# Patient Record
Sex: FEMALE | Race: WHITE | NOT HISPANIC OR LATINO | Employment: FULL TIME | URBAN - METROPOLITAN AREA
[De-identification: names, ages, dates, MRNs, and addresses within clinical notes are randomized per-mention and may not be internally consistent; named-entity substitution may affect disease eponyms.]

---

## 2021-07-30 ENCOUNTER — IMMUNIZATION (OUTPATIENT)
Dept: PRIMARY CARE CLINIC | Facility: CLINIC | Age: 46
End: 2021-07-30

## 2021-07-30 DIAGNOSIS — Z23 NEED FOR VACCINATION: Primary | ICD-10-CM

## 2021-08-27 ENCOUNTER — IMMUNIZATION (OUTPATIENT)
Dept: PRIMARY CARE CLINIC | Facility: CLINIC | Age: 46
End: 2021-08-27
Payer: OTHER GOVERNMENT

## 2021-08-27 DIAGNOSIS — Z23 NEED FOR VACCINATION: Primary | ICD-10-CM

## 2021-08-27 PROCEDURE — 0002A COVID-19, MRNA, LNP-S, PF, 30 MCG/0.3 ML DOSE VACCINE: ICD-10-PCS | Mod: CV19,,, | Performed by: EMERGENCY MEDICINE

## 2021-08-27 PROCEDURE — 0002A COVID-19, MRNA, LNP-S, PF, 30 MCG/0.3 ML DOSE VACCINE: CPT | Mod: CV19,,, | Performed by: EMERGENCY MEDICINE

## 2021-08-27 PROCEDURE — 91300 COVID-19, MRNA, LNP-S, PF, 30 MCG/0.3 ML DOSE VACCINE: ICD-10-PCS | Mod: ,,, | Performed by: EMERGENCY MEDICINE

## 2021-08-27 PROCEDURE — 91300 COVID-19, MRNA, LNP-S, PF, 30 MCG/0.3 ML DOSE VACCINE: CPT | Mod: ,,, | Performed by: EMERGENCY MEDICINE

## 2022-07-14 ENCOUNTER — TELEPHONE (OUTPATIENT)
Dept: OBSTETRICS AND GYNECOLOGY | Facility: CLINIC | Age: 47
End: 2022-07-14
Payer: COMMERCIAL

## 2022-07-14 NOTE — TELEPHONE ENCOUNTER
Pt called to schedule a hormone consult. Pt states on hormones but started having menopausal symptoms again. advised pt she will have to fill out a questionnaire first then we can get her scheduled. Will send over to Leslee to send pt questionnaire

## 2022-07-14 NOTE — TELEPHONE ENCOUNTER
----- Message from Candy Nieto sent at 7/14/2022  3:25 PM CDT -----  Good afternoon. Pt would like to est care with Dr. Vora. Pt is having some hormone problem. Pt canbe reached at [phone].

## 2022-07-18 ENCOUNTER — PATIENT MESSAGE (OUTPATIENT)
Dept: OBSTETRICS AND GYNECOLOGY | Facility: CLINIC | Age: 47
End: 2022-07-18
Payer: COMMERCIAL

## 2022-10-26 ENCOUNTER — OFFICE VISIT (OUTPATIENT)
Dept: OBSTETRICS AND GYNECOLOGY | Facility: CLINIC | Age: 47
End: 2022-10-26
Attending: OBSTETRICS & GYNECOLOGY
Payer: COMMERCIAL

## 2022-10-26 VITALS
DIASTOLIC BLOOD PRESSURE: 83 MMHG | WEIGHT: 152.38 LBS | HEIGHT: 67 IN | SYSTOLIC BLOOD PRESSURE: 121 MMHG | BODY MASS INDEX: 23.92 KG/M2 | HEART RATE: 80 BPM

## 2022-10-26 DIAGNOSIS — N95.1 MENOPAUSAL SYMPTOMS: Primary | ICD-10-CM

## 2022-10-26 PROCEDURE — 3079F DIAST BP 80-89 MM HG: CPT | Mod: CPTII,S$GLB,, | Performed by: PHYSICIAN ASSISTANT

## 2022-10-26 PROCEDURE — 1160F RVW MEDS BY RX/DR IN RCRD: CPT | Mod: CPTII,S$GLB,, | Performed by: PHYSICIAN ASSISTANT

## 2022-10-26 PROCEDURE — 3079F PR MOST RECENT DIASTOLIC BLOOD PRESSURE 80-89 MM HG: ICD-10-PCS | Mod: CPTII,S$GLB,, | Performed by: PHYSICIAN ASSISTANT

## 2022-10-26 PROCEDURE — 3074F SYST BP LT 130 MM HG: CPT | Mod: CPTII,S$GLB,, | Performed by: PHYSICIAN ASSISTANT

## 2022-10-26 PROCEDURE — 99999 PR PBB SHADOW E&M-EST. PATIENT-LVL IV: ICD-10-PCS | Mod: PBBFAC,,, | Performed by: PHYSICIAN ASSISTANT

## 2022-10-26 PROCEDURE — 1159F PR MEDICATION LIST DOCUMENTED IN MEDICAL RECORD: ICD-10-PCS | Mod: CPTII,S$GLB,, | Performed by: PHYSICIAN ASSISTANT

## 2022-10-26 PROCEDURE — 99204 OFFICE O/P NEW MOD 45 MIN: CPT | Mod: S$GLB,,, | Performed by: PHYSICIAN ASSISTANT

## 2022-10-26 PROCEDURE — 1160F PR REVIEW ALL MEDS BY PRESCRIBER/CLIN PHARMACIST DOCUMENTED: ICD-10-PCS | Mod: CPTII,S$GLB,, | Performed by: PHYSICIAN ASSISTANT

## 2022-10-26 PROCEDURE — 3074F PR MOST RECENT SYSTOLIC BLOOD PRESSURE < 130 MM HG: ICD-10-PCS | Mod: CPTII,S$GLB,, | Performed by: PHYSICIAN ASSISTANT

## 2022-10-26 PROCEDURE — 99204 PR OFFICE/OUTPT VISIT, NEW, LEVL IV, 45-59 MIN: ICD-10-PCS | Mod: S$GLB,,, | Performed by: PHYSICIAN ASSISTANT

## 2022-10-26 PROCEDURE — 1159F MED LIST DOCD IN RCRD: CPT | Mod: CPTII,S$GLB,, | Performed by: PHYSICIAN ASSISTANT

## 2022-10-26 PROCEDURE — 99999 PR PBB SHADOW E&M-EST. PATIENT-LVL IV: CPT | Mod: PBBFAC,,, | Performed by: PHYSICIAN ASSISTANT

## 2022-10-26 RX ORDER — PROGESTERONE 100 MG/1
100 CAPSULE ORAL NIGHTLY
COMMUNITY
Start: 2022-10-26 | End: 2023-11-10

## 2022-10-26 RX ORDER — ESTRADIOL 1 MG/1
1 TABLET ORAL DAILY
Qty: 30 TABLET | Refills: 11 | Status: SHIPPED | OUTPATIENT
Start: 2022-10-26 | End: 2023-10-04

## 2022-10-26 RX ORDER — PROGESTERONE 100 MG/1
100 CAPSULE ORAL DAILY
Qty: 30 CAPSULE | Refills: 11 | Status: SHIPPED | OUTPATIENT
Start: 2022-10-26 | End: 2023-10-26

## 2022-10-26 NOTE — PROGRESS NOTES
Subjective:      Marie Singh is a 47 y.o. female who presents for HRT consult. Menarche at age 12. She is . Menopause at age 42. Started on HRT with PCP about 4 years ago for increased anxiety, mood swings and hot flashes. Has been taking Progesterone 100mg QHS and compounded Biest (50:50)/ 10/ 1mg Testosterone cream daily. Working well until started having vaginal spotting in , July and August of this year. Light bleeding and only lasted 1-2 days each month. PCP obtained pelvic US  on 8/10/22 that showed sub endometrial cyst. All bleeding has resolved and feels good. However, not sure if these are the hormones that she should be on.   No prior cardiac history, family history of MI prior to age 50, or personal history of gestational DM/pre-eclampsia. She denies the following contraindications to HRT:  Vaginal bleeding, history of VTE/PE, thrombophilia,  breast cancer, or active liver disease.     Sister with breast cancer at age 36, genetics negative. Sister is a patient of Dr. Vora and would like to establish care with her.     8/10/22 Labs (scanned in media)  Estradiol 103  Total Testosterone 42    PCP: Radha Inman    Routine labs: 22 (scanned in media)  WWE: 2021 with PCP  Pap smear: 22 negative (Scanned in media)  Mammogram: 7/15/22 TC 19.3% at [facility] (scanned in media)      No visits with results within 3 Month(s) from this visit.   Latest known visit with results is:   No results found for any previous visit.       History reviewed. No pertinent past medical history.  History reviewed. No pertinent surgical history.  Social History     Tobacco Use    Smoking status: Never    Smokeless tobacco: Never   Substance Use Topics    Alcohol use: Yes    Drug use: Never     Family History   Problem Relation Age of Onset    Breast cancer Sister      OB History    Para Term  AB Living   2 2           SAB IAB Ectopic Multiple Live Births                  # Outcome Date GA Lbr  "Bryce/2nd Weight Sex Delivery Anes PTL Lv   2 Para            1 Para                Current Outpatient Medications:     progesterone (PROMETRIUM) 100 MG capsule, Take 100 mg by mouth every evening., Disp: , Rfl:     ciprofloxacin HCl (CIPRO) 500 MG tablet, Take 1 tablet (500 mg total) by mouth 2 (two) times a day., Disp: 14 tablet, Rfl: 0    dexAMETHasone (DECADRON) 4 MG Tab, Take 1 tablet by mouth once daily, Disp: 4 tablet, Rfl: 0    estradioL (ESTRACE) 1 MG tablet, Take 1 tablet (1 mg total) by mouth once daily., Disp: 30 tablet, Rfl: 11    ondansetron (ZOFRAN-ODT) 4 MG TbDL, Take 1 tablet (4 mg total) by mouth every 8 (eight) hours as needed (nausea)., Disp: 20 tablet, Rfl: 3    progesterone (PROMETRIUM) 100 MG capsule, Take 1 capsule (100 mg total) by mouth once daily., Disp: 30 capsule, Rfl: 11    semaglutide (OZEMPIC) 0.25 mg or 0.5 mg(2 mg/1.5 mL) pen injector, Inject 0.25 mg into the skin every 7 days., Disp: 1 pen, Rfl: 5    UNABLE TO FIND, medication name: Testosterone 1% Cream 4 clicks (1mg) daily to upper inner thigh, Disp: 30 g, Rfl: 5    The ASCVD Risk score (Teetee DK, et al., 2019) failed to calculate for the following reasons:    Cannot find a previous HDL lab    Cannot find a previous total cholesterol lab    Review of Systems:  General: No fever, chills, or weight loss.  Chest: No chest pain, shortness of breath, or palpitations.  Breast: No pain, masses, or nipple discharge.  Vulva: No pain, lesions, or itching.  Vagina: No relaxation, itching, discharge, or lesions.  Abdomen: No pain, nausea, vomiting, diarrhea, or constipation.  Urinary: No incontinence, nocturia, frequency, or dysuria.  Extremities:  No leg cramps, edema, or calf pain.  Neurologic: No headaches, dizziness, or visual changes.    Objective:     Vitals:    10/26/22 1427   BP: 121/83   Pulse: 80   Weight: 69.1 kg (152 lb 6.4 oz)   Height: 5' 7" (1.702 m)   PainSc: 0-No pain     Body mass index is 23.87 kg/m².      Physical Exam: " Deferred      Assessment:      Menopausal symptoms: Likely getting more estradiol with compounded cream causing bleeding with estradiol level of 103 while taking progesterone 100mg.  Will switch to oral estrace 1mg and continue progesterone 100mg to have stable hormone levels. Continues testosterone cream for [facility]. Spoke with pharmacist and confirmed continue dose of testosterone cream 1mg daily.  -     estradioL (ESTRACE) 1 MG tablet; Take 1 tablet (1 mg total) by mouth once daily.  Dispense: 30 tablet; Refill: 11  -     progesterone (PROMETRIUM) 100 MG capsule; Take 1 capsule (100 mg total) by mouth once daily.  Dispense: 30 capsule; Refill: 11  -     Estradiol; Future; Expected date: 10/26/2022  -     Testosterone, free; Future; Expected date: 10/26/2022      Plan:   Risks and benefits of hormone replacement therapy were discussed.  D/C Biest/Testosterone cream.  Start estrace 1mg QAM.  Continue Progesterone 100mg QHS.  Testosterone 1% cream 1mg daily to upper inner thigh- called into [facility] P# [phone]  Contact if bleeding returns and will repeat pelvic US/schedule Endo bx.  3 month HRT follow up with Dr. Vora with labs 1 week prior.  Follow up sooner PRN.    Instructed patient to call if she experiences any side effects or has any questions.    I spent a total of 35 minutes on the day of the visit.This includes face to face time and non-face to face time preparing to see the patient (eg, review of tests), obtaining and/or reviewing separately obtained history, documenting clinical information in the electronic or other health record, independently interpreting results and communicating results to the patient/family/caregiver, or care coordinator.    A full discussion of the benefit-risk ratio of hormonal replacement therapy was carried out. Improvement in vasomotor and other climacteric symptoms is discussed, including possible improvements in sleep and mood. The range of side effects such as  breast tenderness, weight gain and including possible increases in lifetime risk of breast cancer and possible thrombotic complications was discussed. All of her questions about this therapy were answered.

## 2023-01-13 ENCOUNTER — TELEPHONE (OUTPATIENT)
Dept: OBSTETRICS AND GYNECOLOGY | Facility: CLINIC | Age: 48
End: 2023-01-13
Payer: COMMERCIAL

## 2023-01-13 NOTE — TELEPHONE ENCOUNTER
----- Message from Oly Smallwood MA sent at 10/28/2022  3:16 PM CDT -----  She needs an HRT follow up with Dr. Vora in 3 months with hormone levels a week prior please. Her sister sees Vielka and would like to establish. Thanks

## 2023-05-03 ENCOUNTER — PATIENT MESSAGE (OUTPATIENT)
Dept: OBSTETRICS AND GYNECOLOGY | Facility: CLINIC | Age: 48
End: 2023-05-03
Payer: COMMERCIAL

## 2023-05-09 ENCOUNTER — OFFICE VISIT (OUTPATIENT)
Dept: OBSTETRICS AND GYNECOLOGY | Facility: CLINIC | Age: 48
End: 2023-05-09
Payer: COMMERCIAL

## 2023-05-09 VITALS
SYSTOLIC BLOOD PRESSURE: 112 MMHG | WEIGHT: 140 LBS | BODY MASS INDEX: 21.97 KG/M2 | HEIGHT: 67 IN | DIASTOLIC BLOOD PRESSURE: 79 MMHG

## 2023-05-09 DIAGNOSIS — N95.1 MENOPAUSAL SYMPTOMS: Primary | ICD-10-CM

## 2023-05-09 PROCEDURE — 1160F RVW MEDS BY RX/DR IN RCRD: CPT | Mod: CPTII,S$GLB,, | Performed by: PHYSICIAN ASSISTANT

## 2023-05-09 PROCEDURE — 99213 PR OFFICE/OUTPT VISIT, EST, LEVL III, 20-29 MIN: ICD-10-PCS | Mod: S$GLB,,, | Performed by: PHYSICIAN ASSISTANT

## 2023-05-09 PROCEDURE — 3078F PR MOST RECENT DIASTOLIC BLOOD PRESSURE < 80 MM HG: ICD-10-PCS | Mod: CPTII,S$GLB,, | Performed by: PHYSICIAN ASSISTANT

## 2023-05-09 PROCEDURE — 1159F MED LIST DOCD IN RCRD: CPT | Mod: CPTII,S$GLB,, | Performed by: PHYSICIAN ASSISTANT

## 2023-05-09 PROCEDURE — 3078F DIAST BP <80 MM HG: CPT | Mod: CPTII,S$GLB,, | Performed by: PHYSICIAN ASSISTANT

## 2023-05-09 PROCEDURE — 1160F PR REVIEW ALL MEDS BY PRESCRIBER/CLIN PHARMACIST DOCUMENTED: ICD-10-PCS | Mod: CPTII,S$GLB,, | Performed by: PHYSICIAN ASSISTANT

## 2023-05-09 PROCEDURE — 3074F SYST BP LT 130 MM HG: CPT | Mod: CPTII,S$GLB,, | Performed by: PHYSICIAN ASSISTANT

## 2023-05-09 PROCEDURE — 99999 PR PBB SHADOW E&M-EST. PATIENT-LVL III: CPT | Mod: PBBFAC,,, | Performed by: PHYSICIAN ASSISTANT

## 2023-05-09 PROCEDURE — 3008F PR BODY MASS INDEX (BMI) DOCUMENTED: ICD-10-PCS | Mod: CPTII,S$GLB,, | Performed by: PHYSICIAN ASSISTANT

## 2023-05-09 PROCEDURE — 1159F PR MEDICATION LIST DOCUMENTED IN MEDICAL RECORD: ICD-10-PCS | Mod: CPTII,S$GLB,, | Performed by: PHYSICIAN ASSISTANT

## 2023-05-09 PROCEDURE — 99999 PR PBB SHADOW E&M-EST. PATIENT-LVL III: ICD-10-PCS | Mod: PBBFAC,,, | Performed by: PHYSICIAN ASSISTANT

## 2023-05-09 PROCEDURE — 3074F PR MOST RECENT SYSTOLIC BLOOD PRESSURE < 130 MM HG: ICD-10-PCS | Mod: CPTII,S$GLB,, | Performed by: PHYSICIAN ASSISTANT

## 2023-05-09 PROCEDURE — 99213 OFFICE O/P EST LOW 20 MIN: CPT | Mod: S$GLB,,, | Performed by: PHYSICIAN ASSISTANT

## 2023-05-09 PROCEDURE — 3008F BODY MASS INDEX DOCD: CPT | Mod: CPTII,S$GLB,, | Performed by: PHYSICIAN ASSISTANT

## 2023-05-09 NOTE — PROGRESS NOTES
Subjective:      Marie Singh is a 47 y.o. female who presents for follow-up of hormone replacement therapy.  At her last visit on 10/26/2022, she reported that she was feeling well on Progesterone 100mg QHS and compounded Biest (50:50)/ 10/ 1mg Testosterone cream daily. Had episodes of spotting with the cream. Switched to oral estradiol.    PLAN on 10/26/2022:  D/C Biest/Testosterone cream.  Start estrace 1mg QAM.  Continue Progesterone 100mg QHS.  Testosterone 1% cream 1mg daily to upper inner thigh- called into [facility]    The patient reports that she is feeling well. Mood is good. Denies vaginal bleeding. Denies adverse side effects. She did have hot flashes for about 1 week but have since resolved. Had not had any prior. Her libido is still low and reports fatigue.     PCP: Radha Inman    Routine labs: 22 (scanned in media)  WWE: 2021 with PCP  Pap smear: 22 negative (Scanned in media)  Mammogram: 7/15/22 TC 19.3% at [facility] (scanned in media)    Lab Visit on 2023   Component Date Value Ref Range Status    Estradiol 2023 34  See Text pg/mL Final    Testosterone, Free 2023 <0.4  pg/mL Final       No past medical history on file.  No past surgical history on file.  Social History     Tobacco Use    Smoking status: Never    Smokeless tobacco: Never   Substance Use Topics    Alcohol use: Yes    Drug use: Never     Family History   Problem Relation Age of Onset    Breast cancer Sister      OB History    Para Term  AB Living   2 2           SAB IAB Ectopic Multiple Live Births                  # Outcome Date GA Lbr Bryce/2nd Weight Sex Delivery Anes PTL Lv   2 Para            1 Para                Current Outpatient Medications:     amoxicillin (AMOXIL) 500 MG capsule, Take 1 capsule (500 mg total) by mouth every 12 (twelve) hours., Disp: 20 capsule, Rfl: 0    ciprofloxacin HCl (CIPRO) 500 MG tablet, Take 1 tablet (500 mg total) by mouth 2 (two) times a day., Disp: 14 tablet,  "Rfl: 0    dexAMETHasone (DECADRON) 4 MG Tab, Take 1 tablet (4 mg total) by mouth once daily., Disp: 4 tablet, Rfl: 1    estradioL (ESTRACE) 1 MG tablet, Take 1 tablet (1 mg total) by mouth once daily., Disp: 30 tablet, Rfl: 11    ondansetron (ZOFRAN-ODT) 4 MG TbDL, Take 1 tablet (4 mg total) by mouth every 8 (eight) hours as needed (nausea)., Disp: 20 tablet, Rfl: 3    progesterone (PROMETRIUM) 100 MG capsule, Take 100 mg by mouth every evening., Disp: , Rfl:     progesterone (PROMETRIUM) 100 MG capsule, Take 1 capsule (100 mg total) by mouth once daily., Disp: 30 capsule, Rfl: 11    semaglutide (OZEMPIC) 0.25 mg or 0.5 mg(2 mg/1.5 mL) pen injector, Inject 0.25 mg into the skin every 7 days., Disp: 1 pen, Rfl: 5    UNABLE TO FIND, medication name: Testosterone 2% Cream 4 clicks (1mg) daily to upper inner thigh, Disp: 30 g, Rfl: 5    Review of Systems:  General: No fever, chills, or weight loss.  Chest: No chest pain, shortness of breath, or palpitations.  Breast: No pain, masses, or nipple discharge.  Vulva: No pain, lesions, or itching.  Vagina: No relaxation, itching, discharge, or lesions.  Abdomen: No pain, nausea, vomiting, diarrhea, or constipation.  Urinary: No incontinence, nocturia, frequency, or dysuria.  Extremities:  No leg cramps, edema, or calf pain.  Neurologic: No headaches, dizziness, or visual changes.    Objective:     Vitals:    05/09/23 1409   BP: 112/79   Weight: 63.5 kg (140 lb)   Height: 5' 7" (1.702 m)   PainSc: 0-No pain     Body mass index is 21.93 kg/m².      Physical Exam: Deferred       Assessment:    Menopausal symptoms  -     UNABLE TO FIND; medication name: Testosterone 2% Cream 4 clicks (1mg) daily to upper inner thigh  Dispense: 30 g; Refill: 5  -     Estradiol; Future; Expected date: 05/09/2023  -     Testosterone, free; Future; Expected date: 05/09/2023        Plan:   Continue estrace 1mg QAM.  Continue Progesterone 100mg QHS.  Increase Testosterone  to 2 % cream 1mg daily to " upper inner thigh- called into [facility]  Follow up in 3 months for WWE  Hormone levels one week prior.  Follow up sooner PRN    Instructed patient to call if she experiences any side effects or has any questions.    I spent a total of 25 minutes on the day of the visit.This includes face to face time and non-face to face time preparing to see the patient (eg, review of tests), obtaining and/or reviewing separately obtained history, documenting clinical information in the electronic or other health record, independently interpreting results and communicating results to the patient/family/caregiver, or care coordinator.

## 2023-06-26 PROBLEM — Z00.00 WELL ADULT EXAM: Status: ACTIVE | Noted: 2023-06-26

## 2023-06-26 PROBLEM — F41.9 ANXIETY: Status: ACTIVE | Noted: 2023-06-26

## 2023-07-31 ENCOUNTER — PATIENT MESSAGE (OUTPATIENT)
Dept: OBSTETRICS AND GYNECOLOGY | Facility: CLINIC | Age: 48
End: 2023-07-31

## 2023-07-31 ENCOUNTER — OFFICE VISIT (OUTPATIENT)
Dept: OBSTETRICS AND GYNECOLOGY | Facility: CLINIC | Age: 48
End: 2023-07-31
Payer: COMMERCIAL

## 2023-07-31 VITALS
DIASTOLIC BLOOD PRESSURE: 80 MMHG | HEIGHT: 67 IN | BODY MASS INDEX: 22.23 KG/M2 | WEIGHT: 141.63 LBS | SYSTOLIC BLOOD PRESSURE: 125 MMHG

## 2023-07-31 DIAGNOSIS — Z12.31 VISIT FOR SCREENING MAMMOGRAM: ICD-10-CM

## 2023-07-31 DIAGNOSIS — N95.1 MENOPAUSAL SYMPTOMS: ICD-10-CM

## 2023-07-31 DIAGNOSIS — Z01.419 PAP TEST, AS PART OF ROUTINE GYNECOLOGICAL EXAMINATION: ICD-10-CM

## 2023-07-31 DIAGNOSIS — Z01.419 ENCOUNTER FOR GYNECOLOGICAL EXAMINATION WITHOUT ABNORMAL FINDING: Primary | ICD-10-CM

## 2023-07-31 PROCEDURE — 1159F MED LIST DOCD IN RCRD: CPT | Mod: CPTII,S$GLB,, | Performed by: PHYSICIAN ASSISTANT

## 2023-07-31 PROCEDURE — 3079F PR MOST RECENT DIASTOLIC BLOOD PRESSURE 80-89 MM HG: ICD-10-PCS | Mod: CPTII,S$GLB,, | Performed by: PHYSICIAN ASSISTANT

## 2023-07-31 PROCEDURE — 3008F BODY MASS INDEX DOCD: CPT | Mod: CPTII,S$GLB,, | Performed by: PHYSICIAN ASSISTANT

## 2023-07-31 PROCEDURE — 88175 CYTOPATH C/V AUTO FLUID REDO: CPT | Performed by: PHYSICIAN ASSISTANT

## 2023-07-31 PROCEDURE — 99999 PR PBB SHADOW E&M-EST. PATIENT-LVL III: CPT | Mod: PBBFAC,,, | Performed by: PHYSICIAN ASSISTANT

## 2023-07-31 PROCEDURE — 3044F PR MOST RECENT HEMOGLOBIN A1C LEVEL <7.0%: ICD-10-PCS | Mod: CPTII,S$GLB,, | Performed by: PHYSICIAN ASSISTANT

## 2023-07-31 PROCEDURE — 1160F PR REVIEW ALL MEDS BY PRESCRIBER/CLIN PHARMACIST DOCUMENTED: ICD-10-PCS | Mod: CPTII,S$GLB,, | Performed by: PHYSICIAN ASSISTANT

## 2023-07-31 PROCEDURE — 1160F RVW MEDS BY RX/DR IN RCRD: CPT | Mod: CPTII,S$GLB,, | Performed by: PHYSICIAN ASSISTANT

## 2023-07-31 PROCEDURE — 87624 HPV HI-RISK TYP POOLED RSLT: CPT | Performed by: PHYSICIAN ASSISTANT

## 2023-07-31 PROCEDURE — 3074F PR MOST RECENT SYSTOLIC BLOOD PRESSURE < 130 MM HG: ICD-10-PCS | Mod: CPTII,S$GLB,, | Performed by: PHYSICIAN ASSISTANT

## 2023-07-31 PROCEDURE — 3074F SYST BP LT 130 MM HG: CPT | Mod: CPTII,S$GLB,, | Performed by: PHYSICIAN ASSISTANT

## 2023-07-31 PROCEDURE — 3044F HG A1C LEVEL LT 7.0%: CPT | Mod: CPTII,S$GLB,, | Performed by: PHYSICIAN ASSISTANT

## 2023-07-31 PROCEDURE — 3079F DIAST BP 80-89 MM HG: CPT | Mod: CPTII,S$GLB,, | Performed by: PHYSICIAN ASSISTANT

## 2023-07-31 PROCEDURE — 99396 PREV VISIT EST AGE 40-64: CPT | Mod: S$GLB,,, | Performed by: PHYSICIAN ASSISTANT

## 2023-07-31 PROCEDURE — 99999 PR PBB SHADOW E&M-EST. PATIENT-LVL III: ICD-10-PCS | Mod: PBBFAC,,, | Performed by: PHYSICIAN ASSISTANT

## 2023-07-31 PROCEDURE — 1159F PR MEDICATION LIST DOCUMENTED IN MEDICAL RECORD: ICD-10-PCS | Mod: CPTII,S$GLB,, | Performed by: PHYSICIAN ASSISTANT

## 2023-07-31 PROCEDURE — 3008F PR BODY MASS INDEX (BMI) DOCUMENTED: ICD-10-PCS | Mod: CPTII,S$GLB,, | Performed by: PHYSICIAN ASSISTANT

## 2023-07-31 PROCEDURE — 99396 PR PREVENTIVE VISIT,EST,40-64: ICD-10-PCS | Mod: S$GLB,,, | Performed by: PHYSICIAN ASSISTANT

## 2023-07-31 NOTE — PROGRESS NOTES
Subjective:      Marie Singh is a 47 y.o. female who presents for follow-up of hormone replacement therapy and routine WWE.  At her last visit on 5/9/2023, she reported libido and fatigue. Was otherwise feeling well with estrace 1mg, progesterone 100mg and Testosterone cream. Increased testosterone cream to 2% but continued to receive 1% cream from the pharmacy. She reports that her libido is still low. Felt better when she was getting the compounded cream from pharmacy in [place]. Denies adverse side effects.   No history of abnormal pap. Denies vaginal bleeding.     PLAN on 5/9/2023:  Continue estrace 1mg QAM.  Continue Progesterone 100mg QHS.  Increase Testosterone  to 2 % cream 1mg daily to upper inner thigh- called into [facility]    7/25/2023 Labs  Estradiol 45  Free Testosterone 0.6    PCP: Steff Sage NP  Routine labs: 6/22/2023  WWE: 7/2022 with PCP  Pap smear: 7/19/22 negative (Scanned in media)  Mammogram: 7/15/22 TC 19.3% at [facility] (scanned in media)    Lab Visit on 07/25/2023   Component Date Value Ref Range Status    Estradiol 07/25/2023 45  See Text pg/mL Final    Testosterone, Free 07/25/2023 0.6  pg/mL Final   Orders Only on 06/20/2023   Component Date Value Ref Range Status    DALE 06/22/2023 NEGATIVE  NEGATIVE Final    WBC 06/22/2023 4.7  3.8 - 10.8 Thousand/uL Final    RBC 06/22/2023 3.70 (L)  3.80 - 5.10 Million/uL Final    Hemoglobin 06/22/2023 11.9  11.7 - 15.5 g/dL Final    Hematocrit 06/22/2023 34.4 (L)  35.0 - 45.0 % Final    MCV 06/22/2023 93.0  80.0 - 100.0 fL Final    MCH 06/22/2023 32.2  27.0 - 33.0 pg Final    MCHC 06/22/2023 34.6  32.0 - 36.0 g/dL Final    RDW 06/22/2023 11.5  11.0 - 15.0 % Final    Platelets 06/22/2023 256  140 - 400 Thousand/uL Final    MPV 06/22/2023 9.6  7.5 - 12.5 fL Final    Neutrophils, Abs 06/22/2023 2,895  1,500 - 7,800 cells/uL Final    Lymph # 06/22/2023 1,147  850 - 3,900 cells/uL Final    Mono # 06/22/2023 428  200 - 950 cells/uL Final    Eos # 06/22/2023  169  15 - 500 cells/uL Final    Baso # 06/22/2023 61  0 - 200 cells/uL Final    Neutrophils Relative 06/22/2023 61.6  % Final    Lymph % 06/22/2023 24.4  % Final    Mono % 06/22/2023 9.1  % Final    Eosinophil % 06/22/2023 3.6  % Final    Basophil % 06/22/2023 1.3  % Final    Glucose 06/22/2023 97  65 - 99 mg/dL Final    BUN 06/22/2023 12  7 - 25 mg/dL Final    Creatinine 06/22/2023 0.89  0.50 - 0.99 mg/dL Final    eGFR 06/22/2023 79  > OR = 60 mL/min/1.73m2 Final    BUN/Creatinine Ratio 06/22/2023 NOT APPLICABLE  6 - 22 (calc) Final    Sodium 06/22/2023 139  135 - 146 mmol/L Final    Potassium 06/22/2023 4.5  3.5 - 5.3 mmol/L Final    Chloride 06/22/2023 104  98 - 110 mmol/L Final    CO2 06/22/2023 27  20 - 32 mmol/L Final    Calcium 06/22/2023 10.0  8.6 - 10.2 mg/dL Final    Total Protein 06/22/2023 6.8  6.1 - 8.1 g/dL Final    Albumin 06/22/2023 4.4  3.6 - 5.1 g/dL Final    Globulin, Total 06/22/2023 2.4  1.9 - 3.7 g/dL (calc) Final    Albumin/Globulin Ratio 06/22/2023 1.8  1.0 - 2.5 (calc) Final    Total Bilirubin 06/22/2023 0.6  0.2 - 1.2 mg/dL Final    Alkaline Phosphatase 06/22/2023 43  31 - 125 U/L Final    AST 06/22/2023 21  10 - 35 U/L Final    ALT 06/22/2023 16  6 - 29 U/L Final    CRP 06/22/2023 0.4  <8.0 mg/L Final    Hemoglobin A1C 06/22/2023 4.8  <5.7 % of total Hgb Final    Hep A IgM 06/22/2023 NON-REACTIVE  NON-REACTIVE Final    Comment 06/22/2023 For additional information, please refer to http://education.Yovigo/faq/CFJ267 (This link is being provided for informational/ educational purposes only.)   Final    Hepatitis B Surface Ag 06/22/2023 NON-REACTIVE  NON-REACTIVE Final    Comment 06/22/2023 For additional information, please refer to http://[place].[employer].Acorns/faq/KGB463 (This link is being provided for informational/ educational purposes only.)   Final    Hep B C IgM 06/22/2023 NON-REACTIVE  NON-REACTIVE Final    Comment 06/22/2023 For additional information, please  refer to http://education.Tranz/faq/QLD906 (This link is being provided for informational/ educational purposes only.)   Final    Hepatitis C Ab 06/22/2023 NON-REACTIVE  NON-REACTIVE Final    HIV Ag/Ab 4th Gen 06/22/2023 NON-REACTIVE  NON-REACTIVE Final    Cholesterol 06/22/2023 166  <200 mg/dL Final    HDL 06/22/2023 107  > OR = 50 mg/dL Final    Triglycerides 06/22/2023 64  <150 mg/dL Final    LDL Cholesterol 06/22/2023 45  mg/dL (calc) Final    HDL/Cholesterol Ratio 06/22/2023 1.6  <5.0 (calc) Final    Non HDL Chol. (LDL+VLDL) 06/22/2023 59  <130 mg/dL (calc) Final    Rheumatoid Factor 06/22/2023 <14  <14 IU/mL Final    Vitamin D, 25-OH, Total 06/22/2023 35  30 - 100 ng/mL Final    Vitamin B-12 06/22/2023 802  200 - 1,100 pg/mL Final    Color, UA 06/22/2023 YELLOW  YELLOW Final    Appearance, UA 06/22/2023 CLOUDY (A)  CLEAR Final    Specific Gravity, UA 06/22/2023 1.016  1.001 - 1.035 Final    pH, UA 06/22/2023 7.0  5.0 - 8.0 Final    Glucose, UA 06/22/2023 NEGATIVE  NEGATIVE Final    Bilirubin, UA 06/22/2023 NEGATIVE  NEGATIVE Final    Ketones, UA 06/22/2023 NEGATIVE  NEGATIVE Final    Occult Blood UA 06/22/2023 NEGATIVE  NEGATIVE Final    Protein, UA 06/22/2023 TRACE (A)  NEGATIVE Final    Nitrite, UA 06/22/2023 NEGATIVE  NEGATIVE Final    Leukocytes, UA 06/22/2023 2+ (A)  NEGATIVE Final    Uric Acid 06/22/2023 5.6  2.5 - 7.0 mg/dL Final    TSH 06/22/2023 1.44  mIU/L Final    T4, Free 06/22/2023 1.1  0.8 - 1.8 ng/dL Final    Thyroid Peroxidase Ab 06/22/2023 <1  <9 IU/mL Final    Sed Rate 06/22/2023 2  < OR = 20 mm/h Final   Lab Visit on 05/04/2023   Component Date Value Ref Range Status    Estradiol 05/04/2023 34  See Text pg/mL Final    Testosterone, Free 05/04/2023 <0.4  pg/mL Final       No past medical history on file.  No past surgical history on file.  Social History     Tobacco Use    Smoking status: Never    Smokeless tobacco: Never   Substance Use Topics    Alcohol use: Yes    Drug use:  "Never     Family History   Problem Relation Age of Onset    Breast cancer Sister      OB History    Para Term  AB Living   2 2           SAB IAB Ectopic Multiple Live Births                  # Outcome Date GA Lbr Bryce/2nd Weight Sex Delivery Anes PTL Lv   2 Para            1 Para                Current Outpatient Medications:     estradioL (ESTRACE) 1 MG tablet, Take 1 tablet (1 mg total) by mouth once daily., Disp: 30 tablet, Rfl: 11    gentamicin (GARAMYCIN) 0.1 % ointment, Apply topically 3 (three) times daily., Disp: 15 g, Rfl: 0    LORazepam (ATIVAN) 1 MG tablet, Take 1 tablet (1 mg total) by mouth 2 (two) times daily. as needed for anxiety., Disp: 60 tablet, Rfl: 3    progesterone (PROMETRIUM) 100 MG capsule, Take 100 mg by mouth every evening., Disp: , Rfl:     progesterone (PROMETRIUM) 100 MG capsule, Take 1 capsule (100 mg total) by mouth once daily., Disp: 30 capsule, Rfl: 11    semaglutide (OZEMPIC) 0.25 mg or 0.5 mg (2 mg/3 mL) pen injector, Inject 0.5 mg into the skin every 7 days., Disp: 3 mL, Rfl: 3    semaglutide (OZEMPIC) 0.25 mg or 0.5 mg(2 mg/1.5 mL) pen injector, Inject 0.25 mg into the skin every 7 days., Disp: 1 pen, Rfl: 5    UNABLE TO FIND, medication name: Testosterone cream 20mg/mL 1/4mL (1 click) to upper inner thigh daily, Disp: 22.5 mL, Rfl: 1    Review of Systems:  General: No fever, chills, or weight loss.  Chest: No chest pain, shortness of breath, or palpitations.  Breast: No pain, masses, or nipple discharge.  Vulva: No pain, lesions, or itching.  Vagina: No relaxation, itching, discharge, or lesions.  Abdomen: No pain, nausea, vomiting, diarrhea, or constipation.  Urinary: No incontinence, nocturia, frequency, or dysuria.  Extremities:  No leg cramps, edema, or calf pain.  Neurologic: No headaches, dizziness, or visual changes.    Objective:     Vitals:    23 1502   BP: 125/80   Weight: 64.2 kg (141 lb 9.6 oz)   Height: 5' 7" (1.702 m)   PainSc: 0-No pain "     Body mass index is 22.18 kg/m².    PHYSICAL EXAM:  APPEARANCE: Well nourished, well developed, in no acute distress.  AFFECT: WNL, alert and oriented x 3  CHEST: Good respiratory effect  ABDOMEN: Soft.  No tenderness or masses.  No hepatosplenomegaly.  No hernias.  BREASTS: Symmetrical, no skin changes or visible lesions.  No palpable masses, nipple discharge bilaterally.  PELVIC: Normal external genitalia without lesions.  Normal hair distribution.  Adequate perineal body, normal urethral meatus.  Vagina moist and well rugated without lesions or discharge.  Cervix pink, without lesions, discharge or tenderness.  No significant cystocele or rectocele.  Bimanual exam shows uterus to be normal size, regular, mobile and nontender.  Adnexa without masses or tenderness.    EXTREMITIES: No edema.       Assessment:    Encounter for gynecological examination without abnormal finding    Visit for screening mammogram  -     Mammo Digital Screening Bilat w/ Daryn; Future; Expected date: 07/31/2023    Pap test, as part of routine gynecological examination  -     Liquid-Based Pap Smear, Screening  -     HPV High Risk Genotypes, PCR    Menopausal symptoms  -     UNABLE TO FIND; medication name: Testosterone cream 20mg/mL 1/4mL (1 click) to upper inner thigh daily  Dispense: 22.5 mL; Refill: 1        Plan:   Pap/HPV  Annual mammogram- Faxed to [facility]  Continue estrace 1mg QAM.  Continue Progesterone 100mg QHS.  Increase Testosterone cream to 20mg/mL 1 click daily 1/4mL- faxed to [facility].  Follow up in 3 months.     Instructed patient to call if she experiences any side effects or has any questions.

## 2023-08-03 LAB
HPV HR 12 DNA SPEC QL NAA+PROBE: NEGATIVE
HPV16 AG SPEC QL: NEGATIVE
HPV18 DNA SPEC QL NAA+PROBE: NEGATIVE

## 2023-08-08 LAB
FINAL PATHOLOGIC DIAGNOSIS: NORMAL
Lab: NORMAL

## 2023-09-18 ENCOUNTER — PATIENT MESSAGE (OUTPATIENT)
Dept: PEDIATRICS | Facility: CLINIC | Age: 48
End: 2023-09-18
Payer: COMMERCIAL

## 2023-09-25 PROBLEM — Z00.00 WELL ADULT EXAM: Status: RESOLVED | Noted: 2023-06-26 | Resolved: 2023-09-25

## 2023-10-04 DIAGNOSIS — N95.1 MENOPAUSAL SYMPTOMS: ICD-10-CM

## 2023-10-04 RX ORDER — ESTRADIOL 1 MG/1
1 TABLET ORAL
Qty: 30 TABLET | Refills: 11 | Status: SHIPPED | OUTPATIENT
Start: 2023-10-04

## 2023-10-17 ENCOUNTER — PATIENT MESSAGE (OUTPATIENT)
Dept: PODIATRY | Facility: CLINIC | Age: 48
End: 2023-10-17
Payer: COMMERCIAL

## 2024-06-26 ENCOUNTER — PATIENT MESSAGE (OUTPATIENT)
Dept: OBSTETRICS AND GYNECOLOGY | Facility: CLINIC | Age: 49
End: 2024-06-26
Payer: COMMERCIAL

## 2024-06-26 DIAGNOSIS — N95.1 MENOPAUSAL SYMPTOMS: ICD-10-CM

## 2024-09-18 ENCOUNTER — TELEPHONE (OUTPATIENT)
Dept: OBSTETRICS AND GYNECOLOGY | Facility: CLINIC | Age: 49
End: 2024-09-18
Payer: COMMERCIAL

## 2024-10-15 DIAGNOSIS — N95.1 MENOPAUSAL SYMPTOMS: ICD-10-CM

## 2024-10-15 RX ORDER — ESTRADIOL 1 MG/1
1 TABLET ORAL
Qty: 30 TABLET | Refills: 2 | Status: SHIPPED | OUTPATIENT
Start: 2024-10-15

## 2024-11-25 ENCOUNTER — OFFICE VISIT (OUTPATIENT)
Dept: OBSTETRICS AND GYNECOLOGY | Facility: CLINIC | Age: 49
End: 2024-11-25
Payer: COMMERCIAL

## 2024-11-25 VITALS
DIASTOLIC BLOOD PRESSURE: 87 MMHG | HEIGHT: 67 IN | SYSTOLIC BLOOD PRESSURE: 127 MMHG | BODY MASS INDEX: 22.91 KG/M2 | WEIGHT: 146 LBS | HEART RATE: 92 BPM

## 2024-11-25 DIAGNOSIS — N95.1 MENOPAUSAL SYMPTOMS: ICD-10-CM

## 2024-11-25 DIAGNOSIS — Z01.419 ENCOUNTER FOR GYNECOLOGICAL EXAMINATION WITHOUT ABNORMAL FINDING: Primary | ICD-10-CM

## 2024-11-25 DIAGNOSIS — N95.1 MENOPAUSAL AND FEMALE CLIMACTERIC STATES: ICD-10-CM

## 2024-11-25 DIAGNOSIS — Z12.31 VISIT FOR SCREENING MAMMOGRAM: ICD-10-CM

## 2024-11-25 DIAGNOSIS — Z13.820 SCREENING FOR OSTEOPOROSIS: ICD-10-CM

## 2024-11-25 PROCEDURE — 99396 PREV VISIT EST AGE 40-64: CPT | Mod: S$GLB,,, | Performed by: PHYSICIAN ASSISTANT

## 2024-11-25 PROCEDURE — 99999 PR PBB SHADOW E&M-EST. PATIENT-LVL IV: CPT | Mod: PBBFAC,,, | Performed by: PHYSICIAN ASSISTANT

## 2024-11-25 PROCEDURE — 3008F BODY MASS INDEX DOCD: CPT | Mod: CPTII,S$GLB,, | Performed by: PHYSICIAN ASSISTANT

## 2024-11-25 PROCEDURE — 3074F SYST BP LT 130 MM HG: CPT | Mod: CPTII,S$GLB,, | Performed by: PHYSICIAN ASSISTANT

## 2024-11-25 PROCEDURE — 3079F DIAST BP 80-89 MM HG: CPT | Mod: CPTII,S$GLB,, | Performed by: PHYSICIAN ASSISTANT

## 2024-11-25 PROCEDURE — 1159F MED LIST DOCD IN RCRD: CPT | Mod: CPTII,S$GLB,, | Performed by: PHYSICIAN ASSISTANT

## 2024-11-25 PROCEDURE — 1160F RVW MEDS BY RX/DR IN RCRD: CPT | Mod: CPTII,S$GLB,, | Performed by: PHYSICIAN ASSISTANT

## 2024-11-25 RX ORDER — ESTRADIOL 1 MG/1
1 TABLET ORAL DAILY
Qty: 90 TABLET | Refills: 3 | Status: SHIPPED | OUTPATIENT
Start: 2024-11-25

## 2024-11-25 RX ORDER — PROGESTERONE 100 MG/1
100 CAPSULE ORAL NIGHTLY
Qty: 90 CAPSULE | Refills: 3 | Status: SHIPPED | OUTPATIENT
Start: 2024-11-25

## 2024-11-25 NOTE — PROGRESS NOTES
CC: Well woman exam    Marie Singh is a 49 y.o. female  presents for well woman exam.  LMP: No LMP recorded. Patient is postmenopausal. Denies vaginal bleeding. Sexually active with one partner. No history of abnormal pap.   She is currently on HRT and wishes to continue. She is feeling well. She does report vaginal dryness and would like to discuss options for this.    Current Tx:  estrace 1mg QD  progesterone 100mg QHS  Testosterone cream 20mg/mL 1 click daily - to [employer]    PCP: Steff Sage NP  Routine labs: 2023  WWE: 2022 with PCP  Pap smear: 2023 negative, HPV negative  Mammogram: 2024 at [facility] per pt  Colonoscopy: never  DEXA: never    No past medical history on file.  No past surgical history on file.  Social History     Socioeconomic History    Marital status:    Tobacco Use    Smoking status: Never    Smokeless tobacco: Never   Substance and Sexual Activity    Alcohol use: Yes    Drug use: Never    Sexual activity: Yes     Birth control/protection: None     Family History   Problem Relation Name Age of Onset    Breast cancer Sister       OB History          2    Para   2    Term                AB        Living             SAB        IAB        Ectopic        Multiple        Live Births                     Current Outpatient Medications:     dexAMETHasone (DECADRON) 4 MG Tab, Take 1 tablet (4 mg total) by mouth once daily., Disp: 4 tablet, Rfl: 3    estradioL (ESTRACE) 1 MG tablet, Take 1 tablet (1 mg total) by mouth once daily., Disp: 90 tablet, Rfl: 3    gentamicin (GARAMYCIN) 0.1 % ointment, Apply topically 3 (three) times daily., Disp: 15 g, Rfl: 0    LORazepam (ATIVAN) 1 MG tablet, Take 1 tablet (1 mg total) by mouth 2 (two) times daily. as needed for anxiety., Disp: 60 tablet, Rfl: 3    ondansetron (ZOFRAN-ODT) 4 MG TbDL, Take 1 tablet (4 mg total) by mouth 2 (two) times daily., Disp: 30 tablet, Rfl: 3    progesterone (PROMETRIUM) 100 MG capsule, Take 1  "capsule (100 mg total) by mouth every evening., Disp: 90 capsule, Rfl: 3    semaglutide (OZEMPIC) 0.25 mg or 0.5 mg (2 mg/3 mL) pen injector, Inject 0.5 mg into the skin every 7 days., Disp: 3 mL, Rfl: 3    spironolactone (ALDACTONE) 50 MG tablet, Take 1 tablet (50 mg total) by mouth once daily., Disp: 30 tablet, Rfl: 6    UNABLE TO FIND, medication name: Testosterone cream in versabase 20mg/mL Apply 1/4mL (1 click) to labia minora daily, Disp: 22.5 mL, Rfl: 1    The ASCVD Risk score (Teetee DK, et al., 2019) failed to calculate for the following reasons:    The valid HDL cholesterol range is 20 to 100 mg/dL    /87 (Patient Position: Sitting)   Pulse 92   Ht 5' 7" (1.702 m)   Wt 66.2 kg (146 lb)   BMI 22.87 kg/m²       ROS:  GENERAL: Denies weight gain or weight loss. Feeling well overall.   SKIN: Denies rash or lesions.   HEAD: Denies head injury or headache.   NODES: Denies enlarged lymph nodes.   CHEST: Denies chest pain or shortness of breath.   CARDIOVASCULAR: Denies palpitations or left sided chest pain.   ABDOMEN: No abdominal pain, constipation, diarrhea, nausea, vomiting or rectal bleeding.   URINARY: No frequency, dysuria, hematuria, or burning on urination.  REPRODUCTIVE: See HPI.   BREASTS: Denies pain, lumps, or nipple discharge.   HEMATOLOGIC: No easy bruisability or excessive bleeding.   MUSCULOSKELETAL: Denies joint pain or swelling.   NEUROLOGIC: Denies syncope or weakness.   PSYCHIATRIC: Denies depression, anxiety or mood swings.    PHYSICAL EXAM:  APPEARANCE: Well nourished, well developed, in no acute distress.  AFFECT: WNL, alert and oriented x 3  CHEST: Good respiratory effect  ABDOMEN: Soft.  No tenderness or masses.  No hepatosplenomegaly.  No hernias.  BREASTS: Symmetrical, no skin changes or visible lesions.  No palpable masses, nipple discharge bilaterally.  PELVIC: Normal external genitalia without lesions.  Normal hair distribution.  Adequate perineal body, normal urethral " meatus.  Vagina moist and well rugated without lesions or discharge.  Cervix pink, without lesions, discharge or tenderness.  No significant cystocele or rectocele.  Bimanual exam shows uterus to be normal size, regular, mobile and nontender.  Adnexa without masses or tenderness.    EXTREMITIES: No edema.    ASSESSMENT:   Encounter for gynecological examination without abnormal finding    Visit for screening mammogram  -     Mammo Digital Screening Bilat w/ Daryn; Future; Expected date: 11/25/2024    Screening for osteoporosis  -     DXA Bone Density Axial Skeleton 1 or more sites; Future; Expected date: 11/25/2024    Menopausal symptoms  -     Estradiol; Future; Expected date: 11/25/2024  -     Testosterone, free; Future; Expected date: 11/25/2024  -     Testosterone; Future; Expected date: 11/25/2024  -     Discontinue: UNABLE TO FIND; medication name: Testosterone cream 20mg/mL 1/4mL (1 click) to upper inner thigh daily  Dispense: 22.5 mL; Refill: 1  -     estradioL (ESTRACE) 1 MG tablet; Take 1 tablet (1 mg total) by mouth once daily.  Dispense: 90 tablet; Refill: 3  -     UNABLE TO FIND; medication name: Testosterone cream in versabase 20mg/mL Apply 1/4mL (1 click) to labia minora daily  Dispense: 22.5 mL; Refill: 1    Menopausal and female climacteric states  -     progesterone (PROMETRIUM) 100 MG capsule; Take 1 capsule (100 mg total) by mouth every evening.  Dispense: 90 capsule; Refill: 3          PLAN:   Annual mammogram at [facility]  She will send me her most recent mammogram.  Hormone levels ordered.  Will obtain when she does screening labs with PCP  Continue estrace 1mg QD  Continue progesterone 100mg QHS  Continue Testosterone cream 20mg/mL but in versabase and apply to labia minora daily instead of of thigh- faxed to [facility]  Schedule dexa  Encouraged colonoscopy  Follow up annually or PRN    Patient was counseled today on A.C.S. Pap guidelines and recommendations for yearly pelvic exams, mammograms and  monthly self breast exams; to see her PCP for other health maintenance.

## 2025-01-05 DIAGNOSIS — N95.1 MENOPAUSAL SYMPTOMS: ICD-10-CM

## 2025-01-05 RX ORDER — ESTRADIOL 1 MG/1
1 TABLET ORAL
Qty: 30 TABLET | Refills: 11 | Status: SHIPPED | OUTPATIENT
Start: 2025-01-05

## 2025-07-28 DIAGNOSIS — N95.1 MENOPAUSAL SYMPTOMS: ICD-10-CM

## 2025-07-28 NOTE — TELEPHONE ENCOUNTER
----- Message from Melly sent at 7/28/2025  3:06 PM CDT -----  Laura called from [facility] need a refill on Testostorone 20 mg she can be reached at [phone]